# Patient Record
Sex: FEMALE | Race: WHITE
[De-identification: names, ages, dates, MRNs, and addresses within clinical notes are randomized per-mention and may not be internally consistent; named-entity substitution may affect disease eponyms.]

---

## 2017-04-27 ENCOUNTER — HOSPITAL ENCOUNTER (OUTPATIENT)
Dept: HOSPITAL 53 - M LAB REF | Age: 67
End: 2017-04-27
Attending: INTERNAL MEDICINE
Payer: MEDICARE

## 2017-04-27 DIAGNOSIS — R20.2: Primary | ICD-10-CM

## 2017-04-27 LAB
FERRITIN SERPL-MCNC: 63 NG/ML (ref 8–252)
FOLATE SERPL-MCNC: 23.1 NG/ML
VIT B12 SERPL-MCNC: 357 PG/ML

## 2017-09-26 ENCOUNTER — HOSPITAL ENCOUNTER (OUTPATIENT)
Dept: HOSPITAL 53 - M LABNEURO | Age: 67
End: 2017-09-26
Attending: PHYSICIAN ASSISTANT
Payer: MEDICARE

## 2017-09-26 DIAGNOSIS — R51: ICD-10-CM

## 2017-09-26 DIAGNOSIS — M25.50: Primary | ICD-10-CM

## 2017-09-29 LAB
B BURGDOR IGG+IGM SER-ACNC: <0.91 ISR (ref 0–0.9)
B BURGDOR IGM SER IA-ACNC: <0.8 INDEX (ref 0–0.79)

## 2019-04-11 ENCOUNTER — HOSPITAL ENCOUNTER (OUTPATIENT)
Dept: HOSPITAL 53 - M LAB REF | Age: 69
End: 2019-04-11
Attending: INTERNAL MEDICINE
Payer: MEDICARE

## 2019-04-11 DIAGNOSIS — R20.2: Primary | ICD-10-CM

## 2019-04-11 LAB
FOLATE SERPL-MCNC: 23.1 NG/ML
VIT B12 SERPL-MCNC: 613 PG/ML

## 2019-09-11 ENCOUNTER — HOSPITAL ENCOUNTER (OUTPATIENT)
Dept: HOSPITAL 53 - M LAB REF | Age: 69
End: 2019-09-11
Attending: INTERNAL MEDICINE
Payer: MEDICARE

## 2019-09-11 DIAGNOSIS — M79.10: Primary | ICD-10-CM

## 2020-01-09 ENCOUNTER — HOSPITAL ENCOUNTER (OUTPATIENT)
Dept: HOSPITAL 53 - M PLALAB | Age: 70
End: 2020-01-09
Attending: PHYSICIAN ASSISTANT
Payer: MEDICARE

## 2020-01-09 DIAGNOSIS — F33.1: Primary | ICD-10-CM

## 2020-01-09 DIAGNOSIS — Z79.899: ICD-10-CM

## 2020-01-09 LAB
ALBUMIN SERPL BCG-MCNC: 4.1 GM/DL (ref 3.2–5.2)
ALT SERPL W P-5'-P-CCNC: 28 U/L (ref 12–78)
BASOPHILS # BLD AUTO: 0 10^3/UL (ref 0–0.2)
BASOPHILS NFR BLD AUTO: 0.3 % (ref 0–1)
BILIRUB SERPL-MCNC: 0.5 MG/DL (ref 0.2–1)
BUN SERPL-MCNC: 15 MG/DL (ref 7–18)
CALCIUM SERPL-MCNC: 9.1 MG/DL (ref 8.8–10.2)
CHLORIDE SERPL-SCNC: 107 MEQ/L (ref 98–107)
CHOLEST SERPL-MCNC: 229 MG/DL (ref ?–200)
CHOLEST/HDLC SERPL: 1.99 {RATIO} (ref ?–5)
CO2 SERPL-SCNC: 28 MEQ/L (ref 21–32)
CREAT SERPL-MCNC: 0.79 MG/DL (ref 0.55–1.3)
EOSINOPHIL # BLD AUTO: 0.2 10^3/UL (ref 0–0.5)
EOSINOPHIL NFR BLD AUTO: 2.9 % (ref 0–3)
EST. AVERAGE GLUCOSE BLD GHB EST-MCNC: 131 MG/DL (ref 60–110)
GFR SERPL CREATININE-BSD FRML MDRD: > 60 ML/MIN/{1.73_M2} (ref 39–?)
GLUCOSE SERPL-MCNC: 108 MG/DL (ref 70–100)
HCT VFR BLD AUTO: 46.9 % (ref 36–47)
HDLC SERPL-MCNC: 115 MG/DL (ref 40–?)
HGB BLD-MCNC: 14.9 G/DL (ref 12–15.5)
LDLC SERPL CALC-MCNC: 83 MG/DL (ref ?–100)
LYMPHOCYTES # BLD AUTO: 2.7 10^3/UL (ref 1.5–5)
LYMPHOCYTES NFR BLD AUTO: 42 % (ref 24–44)
MCH RBC QN AUTO: 30.7 PG (ref 27–33)
MCHC RBC AUTO-ENTMCNC: 31.8 G/DL (ref 32–36.5)
MCV RBC AUTO: 96.7 FL (ref 80–96)
MONOCYTES # BLD AUTO: 0.5 10^3/UL (ref 0–0.8)
MONOCYTES NFR BLD AUTO: 7.8 % (ref 0–5)
NEUTROPHILS # BLD AUTO: 3 10^3/UL (ref 1.5–8.5)
NEUTROPHILS NFR BLD AUTO: 46.7 % (ref 36–66)
NONHDLC SERPL-MCNC: 114 MG/DL
PLATELET # BLD AUTO: 341 10^3/UL (ref 150–450)
POTASSIUM SERPL-SCNC: 4.6 MEQ/L (ref 3.5–5.1)
PROT SERPL-MCNC: 7.5 GM/DL (ref 6.4–8.2)
RBC # BLD AUTO: 4.85 10^6/UL (ref 4–5.4)
SODIUM SERPL-SCNC: 141 MEQ/L (ref 136–145)
TRIGL SERPL-MCNC: 153 MG/DL (ref ?–150)
WBC # BLD AUTO: 6.5 10^3/UL (ref 4–10)

## 2020-06-01 ENCOUNTER — HOSPITAL ENCOUNTER (OUTPATIENT)
Dept: HOSPITAL 53 - M PLALAB | Age: 70
End: 2020-06-01
Attending: INTERNAL MEDICINE
Payer: MEDICARE

## 2020-06-01 DIAGNOSIS — K59.00: Primary | ICD-10-CM

## 2020-06-01 DIAGNOSIS — R10.13: ICD-10-CM

## 2020-06-01 DIAGNOSIS — K44.9: ICD-10-CM

## 2020-06-01 DIAGNOSIS — R13.10: ICD-10-CM

## 2020-06-01 DIAGNOSIS — E55.9: ICD-10-CM

## 2020-06-01 DIAGNOSIS — R14.3: ICD-10-CM

## 2020-06-01 LAB
25(OH)D3 SERPL-MCNC: 37.4 NG/ML (ref 30–100)
ALBUMIN SERPL BCG-MCNC: 3.8 GM/DL (ref 3.2–5.2)
ALT SERPL W P-5'-P-CCNC: 30 U/L (ref 12–78)
BILIRUB SERPL-MCNC: 0.3 MG/DL (ref 0.2–1)
BUN SERPL-MCNC: 17 MG/DL (ref 7–18)
CALCIUM SERPL-MCNC: 8.9 MG/DL (ref 8.8–10.2)
CHLORIDE SERPL-SCNC: 108 MEQ/L (ref 98–107)
CO2 SERPL-SCNC: 26 MEQ/L (ref 21–32)
CREAT SERPL-MCNC: 0.87 MG/DL (ref 0.55–1.3)
GFR SERPL CREATININE-BSD FRML MDRD: > 60 ML/MIN/{1.73_M2} (ref 39–?)
GLUCOSE SERPL-MCNC: 141 MG/DL (ref 70–100)
MAGNESIUM SERPL-MCNC: 2.1 MG/DL (ref 1.8–2.4)
POTASSIUM SERPL-SCNC: 3.6 MEQ/L (ref 3.5–5.1)
PROT SERPL-MCNC: 7 GM/DL (ref 6.4–8.2)
SODIUM SERPL-SCNC: 141 MEQ/L (ref 136–145)
VIT B12 SERPL-MCNC: 691 PG/ML (ref 247–911)

## 2020-10-22 ENCOUNTER — HOSPITAL ENCOUNTER (OUTPATIENT)
Dept: HOSPITAL 53 - M PLALAB | Age: 70
End: 2020-10-22
Attending: PHYSICIAN ASSISTANT
Payer: MEDICARE

## 2020-10-22 DIAGNOSIS — G90.09: ICD-10-CM

## 2020-10-22 DIAGNOSIS — M54.5: ICD-10-CM

## 2020-10-22 DIAGNOSIS — R53.83: Primary | ICD-10-CM

## 2020-10-22 DIAGNOSIS — R53.1: ICD-10-CM

## 2020-10-22 LAB
FERRITIN SERPL-MCNC: 50 NG/ML (ref 8–252)
FOLATE SERPL-MCNC: > 24 NG/ML (ref 5.4–?)
IRON SERPL-MCNC: 115 UG/DL (ref 50–170)
RHEUMATOID FACT SERPL-ACNC: < 10 IU/ML (ref ?–15)
VIT B12 SERPL-MCNC: 932 PG/ML (ref 247–911)

## 2020-12-03 ENCOUNTER — HOSPITAL ENCOUNTER (OUTPATIENT)
Dept: HOSPITAL 53 - M LABSMTC | Age: 70
End: 2020-12-03
Attending: PEDIATRICS
Payer: SELF-PAY

## 2020-12-03 DIAGNOSIS — Z20.828: Primary | ICD-10-CM

## 2021-03-01 ENCOUNTER — HOSPITAL ENCOUNTER (EMERGENCY)
Dept: HOSPITAL 53 - M ED | Age: 71
Discharge: HOME | End: 2021-03-01
Payer: MEDICARE

## 2021-03-01 VITALS — DIASTOLIC BLOOD PRESSURE: 77 MMHG | SYSTOLIC BLOOD PRESSURE: 141 MMHG
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
## 2021-03-01 DIAGNOSIS — E78.5: ICD-10-CM

## 2021-03-01 DIAGNOSIS — S82.832A: Primary | ICD-10-CM

## 2021-03-01 DIAGNOSIS — Z88.2: ICD-10-CM

## 2021-03-01 DIAGNOSIS — I10: ICD-10-CM
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
## 2021-03-01 DIAGNOSIS — G43.909: ICD-10-CM
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
## 2021-03-01 DIAGNOSIS — Z79.899: ICD-10-CM

## 2021-03-01 DIAGNOSIS — S82.55XA: ICD-10-CM

## 2021-03-01 PROCEDURE — 73590 X-RAY EXAM OF LOWER LEG: CPT

## 2021-03-01 PROCEDURE — 96374 THER/PROPH/DIAG INJ IV PUSH: CPT

## 2021-03-01 PROCEDURE — 99284 EMERGENCY DEPT VISIT MOD MDM: CPT

## 2021-03-01 PROCEDURE — 73610 X-RAY EXAM OF ANKLE: CPT

## 2021-03-01 NOTE — REP
INDICATION:

fall on ice



COMPARISON:

None.



TECHNIQUE:

AP, lateral, bilateral oblique views.



FINDINGS:

There is an oblique nondisplaced fracture of the distal fibular diaphysis and fracture

of the posterior malleolus along with diffuse ankle swelling.



IMPRESSION:

Fractures of the distal fibula and posterior malleolus with swelling.





<Electronically signed by Maycol Lenz > 03/01/21 [id number]

## 2021-03-02 NOTE — REP
INDICATION:

post splinting films



COMPARISON:

03/01/2021



TECHNIQUE:

AP, lateral, bilateral oblique views.



FINDINGS:

Patient is status post splinting for distal fibular and posterior malleolar fractures.



IMPRESSION:

Status post splinting for fractures.





<Electronically signed by Maycol Lenz > 03/01/21 [id number]

## 2021-03-04 ENCOUNTER — HOSPITAL ENCOUNTER (OUTPATIENT)
Dept: HOSPITAL 53 - M LABSMTC | Age: 71
End: 2021-03-04
Attending: ANESTHESIOLOGY
Payer: MEDICARE

## 2021-03-04 DIAGNOSIS — Z01.812: Primary | ICD-10-CM

## 2021-03-04 DIAGNOSIS — Z20.822: ICD-10-CM

## 2021-03-08 ENCOUNTER — HOSPITAL ENCOUNTER (OUTPATIENT)
Dept: HOSPITAL 53 - M SDC | Age: 71
Setting detail: OBSERVATION
LOS: 1 days | Discharge: HOME | End: 2021-03-09
Attending: GENERAL PRACTICE | Admitting: GENERAL PRACTICE
Payer: MEDICARE

## 2021-03-08 VITALS — DIASTOLIC BLOOD PRESSURE: 60 MMHG | SYSTOLIC BLOOD PRESSURE: 116 MMHG

## 2021-03-08 VITALS — BODY MASS INDEX: 24.87 KG/M2 | WEIGHT: 149.25 LBS | HEIGHT: 65 IN

## 2021-03-08 VITALS — SYSTOLIC BLOOD PRESSURE: 142 MMHG | DIASTOLIC BLOOD PRESSURE: 76 MMHG

## 2021-03-08 VITALS — DIASTOLIC BLOOD PRESSURE: 77 MMHG | SYSTOLIC BLOOD PRESSURE: 144 MMHG

## 2021-03-08 VITALS — DIASTOLIC BLOOD PRESSURE: 81 MMHG | SYSTOLIC BLOOD PRESSURE: 151 MMHG

## 2021-03-08 VITALS — DIASTOLIC BLOOD PRESSURE: 69 MMHG | SYSTOLIC BLOOD PRESSURE: 123 MMHG

## 2021-03-08 DIAGNOSIS — J95.89: ICD-10-CM

## 2021-03-08 DIAGNOSIS — Z87.891: ICD-10-CM

## 2021-03-08 DIAGNOSIS — F32.9: ICD-10-CM

## 2021-03-08 DIAGNOSIS — R09.02: ICD-10-CM

## 2021-03-08 DIAGNOSIS — Z79.899: ICD-10-CM

## 2021-03-08 DIAGNOSIS — F41.9: ICD-10-CM

## 2021-03-08 DIAGNOSIS — Z88.2: ICD-10-CM

## 2021-03-08 DIAGNOSIS — Z91.81: ICD-10-CM

## 2021-03-08 DIAGNOSIS — G47.33: ICD-10-CM

## 2021-03-08 DIAGNOSIS — S82.842A: Primary | ICD-10-CM

## 2021-03-08 DIAGNOSIS — E78.00: ICD-10-CM

## 2021-03-08 DIAGNOSIS — K21.9: ICD-10-CM

## 2021-03-08 DIAGNOSIS — J44.9: ICD-10-CM

## 2021-03-08 DIAGNOSIS — I10: ICD-10-CM
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
## 2021-03-08 DIAGNOSIS — G43.909: ICD-10-CM
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
## 2021-03-08 PROCEDURE — 97165 OT EVAL LOW COMPLEX 30 MIN: CPT

## 2021-03-08 PROCEDURE — 96372 THER/PROPH/DIAG INJ SC/IM: CPT

## 2021-03-08 PROCEDURE — 85027 COMPLETE CBC AUTOMATED: CPT

## 2021-03-08 PROCEDURE — 80061 LIPID PANEL: CPT

## 2021-03-08 PROCEDURE — 27814 TREATMENT OF ANKLE FRACTURE: CPT

## 2021-03-08 PROCEDURE — 84443 ASSAY THYROID STIM HORMONE: CPT

## 2021-03-08 PROCEDURE — 36415 COLL VENOUS BLD VENIPUNCTURE: CPT

## 2021-03-08 PROCEDURE — 97530 THERAPEUTIC ACTIVITIES: CPT

## 2021-03-08 PROCEDURE — 80048 BASIC METABOLIC PNL TOTAL CA: CPT

## 2021-03-08 PROCEDURE — 83735 ASSAY OF MAGNESIUM: CPT

## 2021-03-08 PROCEDURE — 93005 ELECTROCARDIOGRAM TRACING: CPT

## 2021-03-08 PROCEDURE — 97161 PT EVAL LOW COMPLEX 20 MIN: CPT

## 2021-03-08 PROCEDURE — 76000 FLUOROSCOPY <1 HR PHYS/QHP: CPT

## 2021-03-08 RX ADMIN — FENTANYL CITRATE PRN MCG: 50 INJECTION, SOLUTION INTRAMUSCULAR; INTRAVENOUS at 17:31

## 2021-03-08 RX ADMIN — FENTANYL CITRATE PRN MCG: 50 INJECTION, SOLUTION INTRAMUSCULAR; INTRAVENOUS at 17:18

## 2021-03-08 RX ADMIN — SODIUM CHLORIDE, POTASSIUM CHLORIDE, SODIUM LACTATE AND CALCIUM CHLORIDE SCH MLS/HR: 600; 310; 30; 20 INJECTION, SOLUTION INTRAVENOUS at 23:47

## 2021-03-08 RX ADMIN — HYDROMORPHONE HYDROCHLORIDE PRN MG: 1 INJECTION, SOLUTION INTRAMUSCULAR; INTRAVENOUS; SUBCUTANEOUS at 18:03

## 2021-03-08 RX ADMIN — HYDROMORPHONE HYDROCHLORIDE PRN MG: 1 INJECTION, SOLUTION INTRAMUSCULAR; INTRAVENOUS; SUBCUTANEOUS at 17:47

## 2021-03-08 NOTE — REP
INDICATION:

ORIF LEFT ANKLE.



COMPARISON:

None.



TECHNIQUE:

Intraoperative fluoroscopic imaging using portable C-arm technique.



FINDINGS:

Images demonstrate fixation for distal fibular and medial malleolar fractures.

Posterior malleolar fracture identified.  Total fluoroscopic time 63.3 seconds.



IMPRESSION:

Open reduction and fixation.





<Electronically signed by Maycol Lenz > 03/08/21 [id number]

## 2021-03-08 NOTE — HPEPDOC
[facility] Medical History & Physical


Date of Admission


Mar 8, 2021


Date of Service:  Mar 8, 2021





History and Physical


CHIEF COMPLAINT: 





Hypoxia Oxygen saturation mid 80s on room air in the recovery room postop left 

ankle ORIF








HISTORY OF PRESENT ILLNESS: 





70-year-old female, focal with history of obstructive sleep apnea not on CPAP, 

COPD not on home oxygen without hypoxic respiratory failure, status post left 

ankle ORIF secondary to left ankle fracture, status post rheumatic injury 

admitted for observation due to hypoxia postoperatively after being given fent

anyl for pain control. Patient has underlying obstructive sleep apnea but has 

not seen a pulmonologist in several years and managed by her primary care 

physician, Dr. Afsaneh White patient does not have CPAP at home. Patient's 

oxygen level was in the mid 80s on finger pulse oximeter which improved once she

awakened. Hospitalist was asked to admit her for observation overnight due to 

hypoxia was likely secondary to pain medications in the setting of chronic 

obstructive sleep apnea. Patient otherwise denies fever, chills, cough, 

shortness of breath, chest pain, pressure, tightness, lightheadedness, 

dizziness, nausea, vomiting, diarrhea, abdominal pain, constipation, weight 

changes, changes in sleep habits, sore throat, changes in vision, tinnitus, ear 

discharge, polyuria, polydipsia, polyphagia, skin rashes,  depression bilateral 

upper and lower extremity weakness or paresthesias. She complains of 5 out of 10

pain in the left foot, status post ORIF.








PAST MEDICAL HISTORY:


Obstructive sleep apnea not on CPAP, COPD, not oxygen or steroid dependent, 

hypertension, depression, migraines, anxiety, hypercholesterolemia, gastritis, 








PAST SURGICAL HISTORY:


Nissen fundoplication laparoscopic cholecystectomy tracheotomy, bunion removal, 

back surgery, total hysterectomy, EGD








SOCIAL HISTORY:


10-pack-year history of smoking previously smoked 3 packs per day from age 17 to

age 31. Quit many years ago. Quit alcohol use. She is a caregiver for her 

. 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

years but retired since her healthcare proxy is her , Zackery [phone].

She is a full code








FAMILY HISTORY:


Both parents are . Father  age of 48 with CAD, MI. Mother  at 

the age of 92 with heart disease








ALLERGIES: Please see below.








REVIEW OF SYSTEMS:


10 point review of system is negative aside from positive findings in HPI








HOME MEDICATIONS: Please see below. 








PHYSICAL EXAMINATION:


VITAL SIGNS: See below


GENERAL APPEARANCE: Awake, alert, oriented 3. No conversational dyspnea. No 

pallor, icterus or jaundice. Speaks in full sentences without use of respiratory

accessory muscles


HEENT: Face is symmetric. Tongue is midline. Pupils equally round, reactive to 

light accommodation. Extra muscles are intact. Trachea and uvula are midline. No

JVD, thyromegaly, stridor or cervical lymphadenopathy. Dry mucous membranes. No 

carotid bruits


CARDIOVASCULAR: S1, S2 regular rate rhythm, no murmurs, rubs or gallops


LUNGS: Air entry is equal bilaterally. No scoliosis. No use of respiratory a

ccessory muscles. Clear to auscultation with bronchial breath sounds. No 

adventitious breath sounds without wheezing, rales or rhonchi


ABDOMEN: Positive bowel sounds, soft, nontender, nondistended, no rebound, 

guarding, hepatosplenomegaly, or fluid wave. No abdominal bruits noted


EXTREMITIES: Postop left ankle . No pitting edema bilateral lower extremities


SKIN: Warm, dry, pink in color








LABORATORY DATA: See below.








IMAGING: 








Intraoperative fluoroscopic imaging using portable C-arm technique.


FINDINGS:


Images demonstrate fixation for distal fibular and medial malleolar fractures.


Posterior malleolar fracture identified.  Total fluoroscopic time 63.3 seconds.


IMPRESSION:


Open reduction and fixation.


<Electronically signed by Maycol Lenz > [id number]








MICROBIOLOGY: Please see below. 








ASSESSMENT/PLAN:





70-year-old female, focal with history of obstructive sleep apnea not on CPAP, 

COPD not on home oxygen without hypoxic respiratory failure, status post left 

ankle ORIF secondary to left ankle fracture, status post rheumatic injury 

admitted for observation due to hypoxia postoperatively after being given fe

ntanyl for pain control. Patient has underlying obstructive sleep apnea but has 

not seen a pulmonologist in several years and managed by her primary care 

physician, Dr. Afsaneh White patient does not have CPAP at home. Patient's 

oxygen level was in the mid 80s on finger pulse oximeter which improved once she

awakened. Hospitalist was asked to admit her for observation overnight due to 

hypoxia was likely secondary to pain medications in the setting of chronic 

obstructive sleep apnea. 








Postoperative hypoxemia in the setting of chronic obstructive sleep apnea and 

pain medications status post anesthesia


-Patient is otherwise hemodynamically stable without tachypnea or tachycardia.  

She appears stable now awake on room air satting %.  She admits to nonco

mpliance with CPAP due to chronic migraines and intolerance of CPAP with 

worsening headaches during the night.


-MAXINE protocol, 2 L oxygen daily at bedtime.  Minimize sedatives and pain 

medications. No signs of COPD exacerbation without wheezing on examination to 

warrant steroid use. As needed nebulizers will be provided.








 Left ankle fracture status post ORIF


-Postop management per orthopedic surgery, Dr. Stnoe , including DVT 

prophylaxis, pain medications, bowel regimen and activity recommendations. PTOT 

consult in the morning








Hypertension, uncontrolled


-Secondary to pain. Resume home medications. Titrate as needed for better blood 

pressure control








 Hypercholesterolemia


-Chronic








 GERD/history of Nissen fundoplication/gastritis


-Chronic. Resume home meds








Obstructive sleep apnea


-Does not use CPAP at home due to intolerance with worsening migraines at night.








History of migraines


-Currently asymptomatic.  Patient follows at [facility] with Emelia Fernandez and Dr. OBANDO , status post Botox injections and on chronic migraine 

prophylaxis therapy.








COPD, compensated


-Avoid oversedation with pain medications due to risk of respiratory acidosis 

with underlying obstructive sleep apnea and COPD. As needed nebulizers








Diet: 2 g sodium








DVT prophylaxis: Per orthopedic surgery








CODE STATUS: Full code





Vital Signs





Vital Signs








  Date Time  Temp Pulse Resp B/P (MAP) Pulse Ox O2 Delivery O2 Flow Rate FiO2


 


3/8/21 20:00  82 18  94 Room Air  


 


3/8/21 19:45    156/83 (107)    


 


3/8/21 18:15 96.9       


 


3/8/21 17:25       3 











Home Medications


Scheduled


Amlodipine Besylate (Amlodipine Besylate) 5 Mg Tablet, 5 MG PO QHS


Buspirone HCl (Buspirone HCl) 5 Mg Tablet, 5 MG PO DAILY


Erenumab-Aooe (Aimovig Autoinjector) 140 Mg/1 Ml Auto.injct, 140 MG IM QMONTH


   1ST DAY OF THE MONTH 


Escitalopram Oxalate (Lexapro) 20 Mg Tablet, 20 MG PO DAILY


Multivitamin (Multivitamins) 1 Each Tablet, 1 TAB PO DAILY


Nortriptyline HCl (Nortriptyline HCl) 10 Mg Capsule, 10 MG PO QHS


Omega-3 Fatty Acids/Fish Oil (Fish Oil 1,000 mg Capsule) 1 Each Capsule, 1,000 

MG PO DAILY


Pravastatin Sodium (Pravastatin Sodium) 20 Mg Tablet, 20 MG PO QHS


Zonisamide (Zonisamide) 100 Mg Capsule, 100 MG PO QHS





Scheduled PRN


Naratriptan HCl (Naratriptan HCl) 2.5 Mg Tablet, 2.5 MG PO DAILY PRN for 

MIGRAINE


Ondansetron HCl (Ondansetron HCl) 4 Mg Tablet, 4 MG PO Q6H PRN for NAUSEA OR 

VOMITING





Allergies


Coded Allergies:  


     Sulfa (Sulfonamide Antibiotics) (Verified  Allergy, Intermediate, HIVES, 

3/1/21)





A-FIB/CHADSVASC


A-FIB History


Current/History of A-Fib/PAF?:  No


Current PO Anticoag Therapy:  No





Age/Risk Factor Scoring


CHADSVASC:  








CHADSVASC Response (Comments) Value


 


Age Risk Factor Age 65-74 years old 1


 


Gender Risk Factor Female 1


 


Hx of CHF No 0


 


Hx of HTN Yes 1


 


Hx of Stroke/TIA/or VTE No 0


 


Hx of Diabetes No 0


 


Hx of Vascular Disease No 0


 


Total  3











Treatment


Treatment ordered:  NONE











ALIS COLEMAN MD             Mar 8, 2021 20:20

## 2021-03-08 NOTE — ROOPDOC
[facility] Report Of Operation


Report of Operation


DATE OF PROCEDURE: 3/8/21





PREPROCEDURE DIAGNOSES: Left ankle fracture.





POSTPROCEDURE DIAGNOSES: Left ankle fracture.





PROCEDURE: Left ankle open reduction internal fixation. 





SURGEON: Dr. Alexandrea Cannon MD





ASSISTANT: Not applicable





ANESTHESIA: Gen. anesthesia Dr. Alan.





ESTIMATED BLOOD LOSS: Approximately 50 mL. 





COMPLICATIONS: None. 





REMARKS: Follow-up in 2 weeks' time. Nonweightbearing. Prescription sent into 

pharmacy [facility]. Appropriate narcotic counseling 

given. 





Risk factors for harms from taking opioid medications discussed and assessed 

including but not limited to personal or family history of substance use 

disorder, anxiety or depression, pregnancy, age 65 or older, COPD or other 

underlying respiratory conditions, and renal or hepatic insufficiency. 





Discussed with patient concerns and determined any harms they may experience or 

be currently experiencing such as nausea or constipation, feeling sedated or 

confused, breathing interruptions during sleep, or taking or craving more 

opioids than prescribed or difficulty controlling use (addiction). Discussed 

early warning signs of overdose including confusion, sedation, slurred speech, 

abnormal gait. 








PROCEDURE NOTE: The 70-year-old female sustained an unstable left ankle 

fracture. We discussed the pros and cons  to risks and benefits go ahead with 

surgery. She wished to proceed. Marked left lower extremity she had no further 

questions. 





The risks, including, but not limited to bleeding, infection, damage to nerves, 

vessels, and other structures, continued pain, delayed, mal, or nonunion if 

fracture, stiffness, recurrence or re-tear, need for further surgery, blood 

clots, medical problems and death, were discussed. The patient understands and 

provided informed consent. 





DESCRIPTION OF PROCEDURE: Patient is brought to the operating theater. They were

placed supine on the operating room table. General anesthesia was induced. 2 g 

of IV Ancef was given prior to the start of the case. Bump was placed under the 

left hip. Tourniquet was placed to the left thigh 34 inches appropriately 

padded. All bony prominences appropriately padded. SCD used on the down leg. 

Bone foam leg positioner was used to elevate the leg. Leg was prepped and draped

in the usual sterile fashion with chlorhexidine-based prep solution allowing the

prep solution to thoroughly dry for over 3 minutes time prior to draping. 

Preoperative timeout was performed, to confirm the site the patient and the 

surgery.





I began by elevating the limb and inflating the tourniquet to 250 mmHg. I made a

standard lateral incision approach to the distal fibula. I carried this 

dissection down through skin and subcutaneous tissue achieved meticulous he

mostasis. I identified the fracture site. I cleaned away any interposed 

periosteum and hematoma. I achieved a preliminary reduction with pointed 

fracture reduction forceps. I used lag by technique to insert a 2.7 mm fully 

threaded cortical screw from proximal anterior to distal posterior. This 

achieved good purchase and compression at the fracture site. I precontoured a 8 

hole one third tubular plate to the lateral side of the fibula with 3 holes 

proximal to the fracture site and 3 holes distal. I then inserted 3 fully 

threaded cortical screws proximal to the fracture site to secure the plate down 

to bone. I inserted 2 fully threaded cortical screws distal to the fracture as 

well as one fully threaded cancellus screw at the distal most hole of the plate.





I then made a standard slightly curved incision longitudinally over the medial 

malleolus. I carried this dissection down through skin and subcutaneous tissue 

achieved meticulous hemostasis. I protected the saphenous vein and retracted 

this anteriorly. I identified the fracture site. There is a mild amount of 

comminution posteromedially. I removed this from around the posterior tibialis 

tendon. I then placed 2 partially-threaded cancellus 4.0 mm cannulated screws 

across the medial malleolus.





The fractures appeared out to length well aligned with no talar shift and normal

tibiofibular clear space and overlap. I then performed the cotton workup / hook 

test with no increase at the syndesmosis no widening as well as an external 

rotation stress test that showed the syndesmosis to be stable. 





Tourniquet was let down at the end of the case. Wounds were thoroughly irrigated

with normal saline. Subcutaneous tissue is closed with interrupted 2-0 Vicryl 

sutures and skin staples. Skin is clean with wet-to-dry dressing. 12 mL of 1/4% 

Marcaine with 1 in 100,000 epinephrine was instilled in around the incision 

sites. Adaptic, 4 x 8 gauze, ABDs dressings were then placed and over wrapped 

with sterile cast padding. Below-knee 3 sided plaster of Berenice splint was then 

placed and allowed to fully harden with the foot in neutral and overwrapped with

6 inch Ace bandages.





Patient was woken up from a general anesthetic transferred off the operating 

table and taken to postanesthetic care unit in stable condition. All sponge, 

needle and instrument counts are correct. No complications. Estimated blood loss

50 mL. Plan for the patient is to be nonweightbearing follow-up in the office in

2 weeks' time. Prescription has been sent into pharmacy of choice 

electronically. They will be discharged home according to day surgery criteria 

when they are comfortable. I communicated this to her son over the phone prior 

to the case. I saw the patient afterwards in holding and there were 

neurovascularly intact and comfortable. I have chosen not to use VTE prophylaxis

due to this being a below the knee surgery and not routinely indicated.











ALEXANDREA CANNON MD               Mar 8, 2021 19:06

## 2021-03-08 NOTE — ECGEPIP
[facility]

                                       

                                       Test Date:    2021

Pat Name:     CUONG ARORA          Department:   

Patient ID:   [id number]                 Room:         -

Gender:       Female                   Technician:   

:          [id number]               Requested By: MAHSA MCNEAL 

Order Number: [id number]     Reading MD:   Geovanna Maldonado

                                 Measurements

Intervals                              Axis          

Rate:         74                       P:            83

TX:           150                      QRS:          5

QRSD:         78                       T:            53

QT:           404                                    

QTc:          448                                    

                           Interpretive Statements

Normal sinus rhythm

SIMILAR TO 1/19/15

Electronically Signed on 3-8-2021 14:52:09 EST by Geovanna Maldonado

## 2021-03-09 VITALS — SYSTOLIC BLOOD PRESSURE: 122 MMHG | DIASTOLIC BLOOD PRESSURE: 67 MMHG

## 2021-03-09 VITALS — DIASTOLIC BLOOD PRESSURE: 68 MMHG | SYSTOLIC BLOOD PRESSURE: 124 MMHG

## 2021-03-09 VITALS — DIASTOLIC BLOOD PRESSURE: 67 MMHG | SYSTOLIC BLOOD PRESSURE: 126 MMHG

## 2021-03-09 LAB
BUN SERPL-MCNC: 12 MG/DL (ref 7–18)
CALCIUM SERPL-MCNC: 8.4 MG/DL (ref 8.8–10.2)
CHLORIDE SERPL-SCNC: 107 MEQ/L (ref 98–107)
CHOLEST SERPL-MCNC: 173 MG/DL (ref ?–200)
CHOLEST/HDLC SERPL: 1.76 {RATIO} (ref ?–5)
CO2 SERPL-SCNC: 28 MEQ/L (ref 21–32)
CREAT SERPL-MCNC: 0.66 MG/DL (ref 0.55–1.3)
GFR SERPL CREATININE-BSD FRML MDRD: > 60 ML/MIN/{1.73_M2} (ref 39–?)
GLUCOSE SERPL-MCNC: 116 MG/DL (ref 70–100)
HCT VFR BLD AUTO: 34.1 % (ref 36–47)
HDLC SERPL-MCNC: 98 MG/DL (ref 40–?)
HGB BLD-MCNC: 10.8 G/DL (ref 12–15.5)
LDLC SERPL CALC-MCNC: 63 MG/DL (ref ?–100)
MAGNESIUM SERPL-MCNC: 2 MG/DL (ref 1.8–2.4)
MCH RBC QN AUTO: 30.4 PG (ref 27–33)
MCHC RBC AUTO-ENTMCNC: 31.7 G/DL (ref 32–36.5)
MCV RBC AUTO: 96.1 FL (ref 80–96)
NONHDLC SERPL-MCNC: 75 MG/DL
PLATELET # BLD AUTO: 360 10^3/UL (ref 150–450)
POTASSIUM SERPL-SCNC: 4.3 MEQ/L (ref 3.5–5.1)
RBC # BLD AUTO: 3.55 10^6/UL (ref 4–5.4)
SODIUM SERPL-SCNC: 140 MEQ/L (ref 136–145)
TRIGL SERPL-MCNC: 61 MG/DL (ref ?–150)
TSH SERPL DL<=0.005 MIU/L-ACNC: 0.47 UIU/ML (ref 0.36–3.74)
WBC # BLD AUTO: 6.8 10^3/UL (ref 4–10)

## 2021-03-09 RX ADMIN — SODIUM CHLORIDE, POTASSIUM CHLORIDE, SODIUM LACTATE AND CALCIUM CHLORIDE SCH MLS/HR: 600; 310; 30; 20 INJECTION, SOLUTION INTRAVENOUS at 01:15

## 2021-03-09 NOTE — DS.PDOC
Discharge Summary


General


Date of Admission


3/9/21


Date of Discharge


3/9/21





Discharge Summary


PROCEDURES PERFORMED DURING STAY: [None].





ADMITTING DIAGNOSES: 


Postoperative hypoxemia 


Left ankle fracture status post ORIF


Hypertension, uncontrolled


Hypercholesterolemia


Obstructive sleep apnea


GERD/history of Nissen fundoplication/gastritis


History of migraines


COPD, compensated





DISCHARGE DIAGNOSES:





 Left ankle fracture status post ORIF


Hypertension, uncontrolled


Hypercholesterolemia


Obstructive sleep apnea


GERD/history of Nissen fundoplication/gastritis


History of migraines


COPD, compensated





COMPLICATIONS/CHIEF COMPLAINT: Left Ankle Fracture.





HISTORY OF PRESENT ILLNESS: 70-year-old female, focal with history of 

obstructive sleep apnea not on CPAP, COPD not on home oxygen without hypoxic 

respiratory failure, status post left ankle ORIF secondary to left ankle 

fracture, status post rheumatic injury admitted for observation due to hypoxia 

postoperatively after being given fentanyl for pain control. Patient has 

underlying obstructive sleep apnea but has not seen a pulmonologist in several 

years and managed by her primary care physician, Dr. Afsaneh White patient does

 not have CPAP at home. Patient's oxygen level was in the mid 80s on finger 

pulse oximeter which improved once she awakened. Hospitalist was asked to admit 

her for observation overnight due to hypoxia was likely secondary to pain 

medications in the setting of chronic obstructive sleep apnea. Patient otherwise

 denies fever, chills, cough, shortness of breath, chest pain, pressure, 

tightness, lightheadedness, dizziness, nausea, vomiting, diarrhea, abdominal 

pain, constipation, weight changes, changes in sleep habits, sore throat, 

changes in vision, tinnitus, ear discharge, polyuria, polydipsia, polyphagia, 

skin rashes,  depression bilateral upper and lower extremity weakness or 

paresthesias. She complains of 5 out of 10 pain in the left foot, status post 

ORIF.











HOSPITAL COURSE: During the hospital stay following issue addressed





Postoperative hypoxemia in the setting of chronic obstructive sleep apnea and 

pain medications status post anesthesia


Patient was treated with inhalers, oxygen supplementation and CPAP with positive

 dynamics








 Left ankle fracture status post ORIF


-Postop management per orthopedic surgery, Dr. Stone , including DVT 

prophylaxis, pain medications, bowel regimen and activity recommendations








Hypertension, uncontrolled


-Secondary to pain. Resume home medications. Titrate as needed for better blood 

pressure control








 Hypercholesterolemia


-Chronic








 GERD/history of Nissen fundoplication/gastritis


-Chronic. Resume home meds








Obstructive sleep apnea


-Does not use CPAP at home due to intolerance with worsening migraines at night.








History of migraines


-Currently asymptomatic.  Patient follows at [facility] with Emelia Fernandez and Dr. OBANDO , status post Botox injections and on chronic migraine 

prophylaxis therapy.








COPD, compensated


-Avoid oversedation with pain medications due to risk of respiratory acidosis 

with underlying obstructive sleep apnea and COPD. As needed nebulizers





DISCHARGE MEDICATIONS: Please see below.


 


ALLERGIES: Please see below.





PHYSICAL EXAMINATION ON DISCHARGE:


GENERAL APPEARANCE: Awake, alert, oriented 3. No conversational dyspnea. No 

pallor, icterus or jaundice. Speaks in full sentences without use of respiratory

 accessory muscles


HEENT: Face is symmetric. Tongue is midline. Pupils equally round, reactive to 

light accommodation. Extra muscles are intact. Trachea and uvula are midline. No

 JVD, thyromegaly, stridor or cervical lymphadenopathy. Dry mucous membranes. No

 carotid bruits


CARDIOVASCULAR: S1, S2 regular rate rhythm, no murmurs, rubs or gallops


LUNGS: Air entry is equal bilaterally. No scoliosis. No use of respiratory 

accessory muscles. Clear to auscultation with bronchial breath sounds. No 

adventitious breath sounds without wheezing, rales or rhonchi


ABDOMEN: Positive bowel sounds, soft, nontender, nondistended, no rebound, 

guarding, hepatosplenomegaly, or fluid wave. No abdominal bruits noted


EXTREMITIES: Postop left ankle . No pitting edema bilateral lower extremities


SKIN: Warm, dry, pink in color





LABORATORY DATA: Please see below.





IMAGING:INDICATION:


ORIF LEFT ANKLE.





COMPARISON:


None.





TECHNIQUE:


Intraoperative fluoroscopic imaging using portable C-arm technique.





FINDINGS:


Images demonstrate fixation for distal fibular and medial malleolar fractures.


Posterior malleolar fracture identified.  Total fluoroscopic time 63.3 seconds.





IMPRESSION:


Open reduction and fixation.





PROGNOSIS: Fair





ACTIVITY: [As tolerated].





DIET: Regular











DISPOSITION: Home








ITEMS TO FOLLOWUP ON ON OUTPATIENT:


Follow-up with orthopedic surgeon and PCP





DISCHARGE CONDITION: [Stable].





TIME SPENT ON DISCHARGE: 20 minutes.





Vital Signs/I&Os





Vital Signs








  Date Time  Temp Pulse Resp B/P (MAP) Pulse Ox O2 Delivery O2 Flow Rate FiO2


 


3/9/21 11:13   18   Room Air  


 


3/9/21 10:00 98.5 73  124/68 (86) 96   


 


3/9/21 06:00       2.0 














I&O- Last 24 Hours up to 6 AM 


 


 3/9/21





 06:00


 


Intake Total 1585 ml


 


Output Total 850 ml


 


Balance 735 ml











Laboratory Data


Labs 24H


Laboratory Tests 2


3/9/21 05:23: 


Nucleated Red Blood Cells % (auto) 0.0, Anion Gap 5L, Glomerular Filtration Rate

 > 60.0, Calcium Level 8.4L, Magnesium Level 2.0, Triglycerides Level 61, Total 

Cholesterol 173, LDL Cholesterol 63, Non-HDL Cholesterol (LDL + VLDL) 75, Total 

HDL Cholesterol 98, Cholesterol/HDL Ratio 1.765, Thyroid Stimulating Hormone 

(TSH) 0.471


CBC/BMP


Laboratory Tests


3/9/21 05:23











Discharge Medications


Scheduled


Amlodipine Besylate (Amlodipine Besylate) 5 Mg Tablet, 5 MG PO QHS, (Reported)


Buspirone HCl (Buspirone HCl) 5 Mg Tablet, 5 MG PO DAILY, (Reported)


Erenumab-Aooe (Aimovig Autoinjector) 140 Mg/1 Ml Auto.injct, 140 MG IM QMONTH, 

(Reported)


   1ST DAY OF THE MONTH 


Escitalopram Oxalate (Lexapro) 20 Mg Tablet, 20 MG PO DAILY, (Reported)


Multivitamin (Multivitamins) 1 Each Tablet, 1 TAB PO DAILY, (Reported)


Nortriptyline HCl (Nortriptyline HCl) 10 Mg Capsule, 10 MG PO QHS, (Reported)


Omega-3 Fatty Acids/Fish Oil (Fish Oil 1,000 mg Capsule) 1 Each Capsule, 1,000 

MG PO DAILY, (Reported)


Pravastatin Sodium (Pravastatin Sodium) 20 Mg Tablet, 20 MG PO QHS, (Reported)


Zonisamide (Zonisamide) 100 Mg Capsule, 100 MG PO QHS, (Reported)





Scheduled PRN


Naratriptan HCl (Naratriptan HCl) 2.5 Mg Tablet, 2.5 MG PO DAILY PRN for 

MIGRAINE, (Reported)


Ondansetron HCl (Ondansetron HCl) 4 Mg Tablet, 4 MG PO Q6H PRN for NAUSEA OR 

VOMITING, (Reported)





Allergies


Coded Allergies:  


     Sulfa (Sulfonamide Antibiotics) (Verified  Allergy, Intermediate, HIVES, 

3/1/21)











MARIA A CASTANON DO             Mar 9, 2021 17:24

## 2021-03-23 ENCOUNTER — HOSPITAL ENCOUNTER (OUTPATIENT)
Dept: HOSPITAL 53 - M SOG | Age: 71
End: 2021-03-23
Attending: ORTHOPAEDIC SURGERY
Payer: MEDICARE

## 2021-03-23 DIAGNOSIS — Z47.89: Primary | ICD-10-CM

## 2021-03-23 NOTE — REP
INDICATION:

F/U FX.



COMPARISON:

Comparison radiographs March 1, 2021..



TECHNIQUE:

Three views.



FINDINGS:

Patient is status post distal fibular screw plate fixation and medial malleolar

pinning for trimalleolar distal tibial and fibular fractures.  Ankle mortise is

intact.  There is some residual medial and lateral soft tissue swelling.  No

malalignment or displacement seen.  Plantar heel spurring is noted.



IMPRESSION:

Healing distal fibular and medial malleolar fractures.





<Electronically signed by Terrence Do > 03/23/21 [id number]

## 2021-04-15 ENCOUNTER — HOSPITAL ENCOUNTER (OUTPATIENT)
Dept: HOSPITAL 53 - M SOG | Age: 71
End: 2021-04-15
Attending: ORTHOPAEDIC SURGERY
Payer: MEDICARE

## 2021-04-15 DIAGNOSIS — Z47.89: Primary | ICD-10-CM

## 2021-04-15 NOTE — REP
INDICATION:

A. Fracture follow-up.



COMPARISON:

Comparison study March 23, 2021 and March 1, 2021..



TECHNIQUE:

Four views of the left ankle are obtained.



FINDINGS:

There is some periosteal reaction consistent with healing at the posterior malleolar

and medial malleolar fracture sites.  Screw plate fixation device remains in place in

the distal fibula and 2 metallic screws are seen in the medial malleolus.  Ankle

mortise is intact.  Diffuse soft tissue swelling persists.  There are metallic pins in

the 1st metatarsal noted incidentally.



IMPRESSION:

Healing trimalleolar fracture post orthopedic fixation.





<Electronically signed by Terrence Do > 04/15/21 [id number]

## 2021-04-30 ENCOUNTER — HOSPITAL ENCOUNTER (OUTPATIENT)
Dept: HOSPITAL 53 - M PT | Age: 71
End: 2021-04-30
Attending: ORTHOPAEDIC SURGERY
Payer: MEDICARE

## 2021-04-30 DIAGNOSIS — Z48.89: Primary | ICD-10-CM

## 2021-04-30 DIAGNOSIS — S82.899D: ICD-10-CM

## 2021-05-05 NOTE — REP
Addended by: Cedric Valenzuela on: 5/5/2021 03:05 PM     Modules accepted: Orders INDICATION:

fall on ice



COMPARISON:

None.



TECHNIQUE:

AP, and lateral views of the left tibia/fibula.



FINDINGS:

There is a nondisplaced oblique fracture of the distal fibular shaft and posterior

malleolus of the tibia with soft tissue swelling.



IMPRESSION:

Fracture of the distal fibula and posterior tibial malleolus.





<Electronically signed by Maycol Lenz > 03/01/21 [id number]

## 2021-05-27 ENCOUNTER — HOSPITAL ENCOUNTER (OUTPATIENT)
Dept: HOSPITAL 53 - M SOG | Age: 71
End: 2021-05-27
Attending: ORTHOPAEDIC SURGERY
Payer: MEDICARE

## 2021-05-27 DIAGNOSIS — Z47.89: Primary | ICD-10-CM

## 2021-05-27 NOTE — REP
INDICATION:

F/U FX.



COMPARISON:

None.



TECHNIQUE:

AP, lateral, bilateral oblique views of the left ankle



FINDINGS:

Stable fixation for medial and lateral malleolar fractures again noted.  Continued

overlying soft tissue swelling again appreciated.  No obvious new acute pathology.



IMPRESSION:

Relatively stable examination.





<Electronically signed by Maycol Lenz > 05/27/21 [id number]

## 2021-05-28 ENCOUNTER — HOSPITAL ENCOUNTER (OUTPATIENT)
Dept: HOSPITAL 53 - M PT | Age: 71
LOS: 3 days | End: 2021-05-31
Attending: ORTHOPAEDIC SURGERY
Payer: MEDICARE

## 2021-05-28 DIAGNOSIS — Z48.89: Primary | ICD-10-CM

## 2021-06-24 ENCOUNTER — HOSPITAL ENCOUNTER (OUTPATIENT)
Dept: HOSPITAL 53 - M PT | Age: 71
LOS: 6 days | End: 2021-06-30
Attending: ORTHOPAEDIC SURGERY
Payer: MEDICARE

## 2021-06-24 DIAGNOSIS — Z48.89: Primary | ICD-10-CM

## 2021-06-24 DIAGNOSIS — S82.892D: ICD-10-CM
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
## 2021-07-01 ENCOUNTER — HOSPITAL ENCOUNTER (OUTPATIENT)
Dept: HOSPITAL 53 - M WHC | Age: 71
End: 2021-07-01
Attending: INTERNAL MEDICINE
Payer: MEDICARE

## 2021-07-01 DIAGNOSIS — M85.89: Primary | ICD-10-CM

## 2021-07-01 NOTE — DEXAMM
INDICATION:

DISORDER OF BONE/M85.80.



COMPARISON:

Most recent comparison densitometry study is from September 24, 2013.  The most remote

is June 21, 2004.



TECHNIQUE:

Bone density was measured using dual-energy x-ray absorptionmetry (DEXA).



FINDINGS:

AP SPINE L1-L4

BMD 1.262 g/cm2

Young Adult T-Score 0.8

Age Matched Z-Score 2.5.



LT FEMUR, TOTAL

BMD 0.926 g/cm2

Young Adult T-Score -0.6

Age Matched Z-Score 0.9.



LT NECK

BMD 0.863 g/cm2

Young Adult T-Score -1.3

Age Matched Z-Score 0.5.



RT FEMUR, TOTAL

BMD 0.935 g/cm2

Young Adult T-Score -0.6

Age Matched Z-Score 1.0.



RT NECK

BMD 0.843 g/cm2

Young Adult T-Score -1.4

Age Matched Z-Score 0.4.



IMPRESSION:

There is normal bone density of the spine.



There is low bone density of the left hip.





There is low bone density of the right hip.



The density of the spine has increased 20.4% since the initial exam on June 24, 2004.





The density of the spine increased 5.3% since most recent exam on September 24, 2013.





The density of the left hip has decreased 8.1% since initial exam on June 21, 2004.





The density of the left hip has decreased 6.5% since most recent exam on May 14, 2018.





The density of the right hip has decreased 9.2% since the initial exam on June 21, 2004.





The density of the right hip has decreased 3.2% since the most recent exam on May 14,

2018.



FOLLOW-UP:

Recommendation for the next bone density exam: 2 years.





<Electronically signed by Terrence Do > 07/01/21 [id number]

## 2021-07-08 ENCOUNTER — HOSPITAL ENCOUNTER (OUTPATIENT)
Dept: HOSPITAL 53 - M PT | Age: 71
LOS: 23 days | End: 2021-07-31
Attending: ORTHOPAEDIC SURGERY
Payer: MEDICARE

## 2021-07-08 DIAGNOSIS — Z48.89: Primary | ICD-10-CM

## 2021-08-04 ENCOUNTER — HOSPITAL ENCOUNTER (OUTPATIENT)
Dept: HOSPITAL 53 - M LAB REF | Age: 71
End: 2021-08-04
Attending: INTERNAL MEDICINE
Payer: MEDICARE

## 2021-08-04 DIAGNOSIS — R20.2: ICD-10-CM

## 2021-08-04 DIAGNOSIS — R53.83: Primary | ICD-10-CM

## 2021-08-10 ENCOUNTER — HOSPITAL ENCOUNTER (OUTPATIENT)
Dept: HOSPITAL 53 - M LAB | Age: 71
End: 2021-08-10
Attending: ORTHOPAEDIC SURGERY
Payer: MEDICARE
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
## 2021-08-10 DIAGNOSIS — S82.842D: Primary | ICD-10-CM
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
## 2021-08-10 DIAGNOSIS — T81.41XD: ICD-10-CM

## 2021-08-10 LAB
BASOPHILS # BLD AUTO: 0 10^3/UL (ref 0–0.2)
BASOPHILS NFR BLD AUTO: 0.3 % (ref 0–1)
EOSINOPHIL # BLD AUTO: 0.1 10^3/UL (ref 0–0.5)
EOSINOPHIL NFR BLD AUTO: 0.9 % (ref 0–3)
ERYTHROCYTE [SEDIMENTATION RATE] IN BLOOD BY WESTERGREN METHOD: 45 MM/HR (ref 0–30)
HCT VFR BLD AUTO: 33.5 % (ref 36–47)
HGB BLD-MCNC: 10.4 G/DL (ref 12–15.5)
LYMPHOCYTES # BLD AUTO: 2.3 10^3/UL (ref 1.5–5)
LYMPHOCYTES NFR BLD AUTO: 23 % (ref 24–44)
MCH RBC QN AUTO: 28 PG (ref 27–33)
MCHC RBC AUTO-ENTMCNC: 31 G/DL (ref 32–36.5)
MCV RBC AUTO: 90.3 FL (ref 80–96)
MONOCYTES # BLD AUTO: 0.7 10^3/UL (ref 0–0.8)
MONOCYTES NFR BLD AUTO: 7.4 % (ref 2–8)
NEUTROPHILS # BLD AUTO: 6.8 10^3/UL (ref 1.5–8.5)
NEUTROPHILS NFR BLD AUTO: 68.1 % (ref 36–66)
PLATELET # BLD AUTO: 397 10^3/UL (ref 150–450)
RBC # BLD AUTO: 3.71 10^6/UL (ref 4–5.4)
WBC # BLD AUTO: 9.9 10^3/UL (ref 4–10)

## 2021-08-23 ENCOUNTER — HOSPITAL ENCOUNTER (OUTPATIENT)
Dept: HOSPITAL 53 - M PLAIMG | Age: 71
End: 2021-08-23
Attending: INTERNAL MEDICINE
Payer: MEDICARE

## 2021-08-23 DIAGNOSIS — M25.472: ICD-10-CM

## 2021-08-23 DIAGNOSIS — A49.01: Primary | ICD-10-CM

## 2021-08-23 LAB
BASOPHILS # BLD AUTO: 0 10^3/UL (ref 0–0.2)
BASOPHILS NFR BLD AUTO: 0.6 % (ref 0–1)
EOSINOPHIL # BLD AUTO: 0.2 10^3/UL (ref 0–0.5)
EOSINOPHIL NFR BLD AUTO: 2.3 % (ref 0–3)
ERYTHROCYTE [SEDIMENTATION RATE] IN BLOOD BY WESTERGREN METHOD: 18 MM/HR (ref 0–30)
HCT VFR BLD AUTO: 35.8 % (ref 36–47)
HGB BLD-MCNC: 11.3 G/DL (ref 12–15.5)
LYMPHOCYTES # BLD AUTO: 3.1 10^3/UL (ref 1.5–5)
LYMPHOCYTES NFR BLD AUTO: 47.3 % (ref 24–44)
MCH RBC QN AUTO: 28.8 PG (ref 27–33)
MCHC RBC AUTO-ENTMCNC: 31.6 G/DL (ref 32–36.5)
MCV RBC AUTO: 91.3 FL (ref 80–96)
MONOCYTES # BLD AUTO: 0.5 10^3/UL (ref 0–0.8)
MONOCYTES NFR BLD AUTO: 8.3 % (ref 2–8)
NEUTROPHILS # BLD AUTO: 2.7 10^3/UL (ref 1.5–8.5)
NEUTROPHILS NFR BLD AUTO: 41.3 % (ref 36–66)
PLATELET # BLD AUTO: 451 10^3/UL (ref 150–450)
RBC # BLD AUTO: 3.92 10^6/UL (ref 4–5.4)
WBC # BLD AUTO: 6.5 10^3/UL (ref 4–10)

## 2021-08-23 PROCEDURE — 36415 COLL VENOUS BLD VENIPUNCTURE: CPT

## 2021-08-23 PROCEDURE — 86140 C-REACTIVE PROTEIN: CPT

## 2021-08-23 PROCEDURE — 73600 X-RAY EXAM OF ANKLE: CPT

## 2021-08-23 PROCEDURE — 85652 RBC SED RATE AUTOMATED: CPT

## 2021-08-23 PROCEDURE — 85025 COMPLETE CBC W/AUTO DIFF WBC: CPT

## 2021-08-23 NOTE — REP
INDICATION:

METHICILLIN SUSCEP STAPH INFECTION, UNSP SITE



COMPARISON:

05/27/2021



TECHNIQUE:

AP and lateral left ankle.



FINDINGS:

Status post satisfactory open reduction and fixation.  Osseous structures are

otherwise normal and without periosteal reaction or irregularity to suggest

osteomyelitis.  Overlying soft tissue swelling is nonspecific.



IMPRESSION:

Soft tissue swelling.





<Electronically signed by Maycol Lenz > 08/23/21 [id number]

## 2021-11-04 ENCOUNTER — HOSPITAL ENCOUNTER (OUTPATIENT)
Dept: HOSPITAL 53 - M WHC | Age: 71
End: 2021-11-04
Attending: INTERNAL MEDICINE
Payer: MEDICARE

## 2021-11-04 DIAGNOSIS — R92.2: Primary | ICD-10-CM

## 2021-11-04 NOTE — REP
INDICATION:

ENCNTR SCREEN MAMMO FOR MALIG NEOPLASM OF BREAS.



COMPARISON:

Multiple the latest 09/24/2013.  There are no prior DBT images to review



TECHNIQUE:

Digital screening mammography was carried out bilaterally in the CC and MLO

projections using both 2D and 3D modalities and compared to the prior exams.  By

history, the patient has no complaints of a palpable breast abnormality or other

significant breast complaints.



FINDINGS:

The breasts are unchanged in size and shape.  Dense heterogenous nodular

fibroglandular elements are again seen bilaterally.  Scattered stable benign appearing

calcifications are again seen bilaterally.  Some of these are in groups but no one

group appears more suspicious than any other.



In the right breast near 12 o'clock there is a potential nayeli asymmetric density.



In the left breast central retroareolar region there is a potential nayeli density.



No other suspicious features are seen in either breast.  There is no skin thickening

or nipple retraction.



The Volpara volumetric breast density pattern is b.



IMPRESSION:

BIRADS/ACR category 0 mammogram.  Potential finding seen in each breast as described

above.  Diagnostic digital DBT spot compression views are recommended bilaterally.

Near the 12 o'clock position right breast in central retroareolar region left breast.

In addition, bilateral diagnostic ultrasonography may be indicated.



This patient's Tyrer-Cuzick lifetime breast cancer risk assessment score is 3.7%.



This mammogram was interpreted with the aid of an FDA-approved computer-aided

detection system.



The patient states she had a clinical breast exam in August 2021.



The patient letter being requested is M0.



RECOMMENDATION:

As above





<Electronically signed by Abdirizak Greer > 11/04/21 [id number]

## 2021-12-01 ENCOUNTER — HOSPITAL ENCOUNTER (OUTPATIENT)
Dept: HOSPITAL 53 - M WHC | Age: 71
End: 2021-12-01
Attending: INTERNAL MEDICINE
Payer: MEDICARE

## 2021-12-01 DIAGNOSIS — R92.2: Primary | ICD-10-CM

## 2021-12-01 PROCEDURE — 76642 ULTRASOUND BREAST LIMITED: CPT

## 2021-12-01 PROCEDURE — 77066 DX MAMMO INCL CAD BI: CPT

## 2021-12-28 ENCOUNTER — HOSPITAL ENCOUNTER (OUTPATIENT)
Dept: HOSPITAL 53 - M PLAIMG | Age: 71
End: 2021-12-28
Attending: INTERNAL MEDICINE
Payer: MEDICARE

## 2021-12-28 DIAGNOSIS — R05.3: Primary | ICD-10-CM

## 2022-01-19 ENCOUNTER — HOSPITAL ENCOUNTER (OUTPATIENT)
Dept: HOSPITAL 53 - M LABSMTC | Age: 72
End: 2022-01-19
Attending: PEDIATRICS

## 2022-01-19 DIAGNOSIS — Z20.822: Primary | ICD-10-CM

## 2022-03-08 ENCOUNTER — HOSPITAL ENCOUNTER (OUTPATIENT)
Dept: HOSPITAL 53 - M PLAIMG | Age: 72
End: 2022-03-08
Attending: INTERNAL MEDICINE
Payer: MEDICARE

## 2022-03-08 DIAGNOSIS — R05.9: Primary | ICD-10-CM

## 2022-04-14 ENCOUNTER — HOSPITAL ENCOUNTER (OUTPATIENT)
Dept: HOSPITAL 53 - M LAB REF | Age: 72
End: 2022-04-14
Attending: INTERNAL MEDICINE
Payer: MEDICARE

## 2022-04-14 DIAGNOSIS — D50.9: ICD-10-CM

## 2022-04-14 DIAGNOSIS — R53.83: Primary | ICD-10-CM

## 2022-06-09 ENCOUNTER — HOSPITAL ENCOUNTER (OUTPATIENT)
Dept: HOSPITAL 53 - M CARPUL | Age: 72
End: 2022-06-09
Attending: NURSE PRACTITIONER
Payer: MEDICARE

## 2022-06-09 DIAGNOSIS — R05.9: Primary | ICD-10-CM

## 2022-06-09 PROCEDURE — 94070 EVALUATION OF WHEEZING: CPT

## 2022-06-09 PROCEDURE — 95070 INHLJ BRNCL CHALLENGE TSTG: CPT

## 2022-06-22 ENCOUNTER — HOSPITAL ENCOUNTER (OUTPATIENT)
Dept: HOSPITAL 53 - M LAB REF | Age: 72
End: 2022-06-22
Attending: INTERNAL MEDICINE
Payer: MEDICARE

## 2022-06-22 DIAGNOSIS — R53.83: Primary | ICD-10-CM

## 2022-07-14 ENCOUNTER — HOSPITAL ENCOUNTER (OUTPATIENT)
Dept: HOSPITAL 53 - M CARPUL | Age: 72
End: 2022-07-14
Attending: NURSE PRACTITIONER
Payer: MEDICARE

## 2022-07-14 DIAGNOSIS — R05.9: Primary | ICD-10-CM

## 2022-10-07 ENCOUNTER — HOSPITAL ENCOUNTER (OUTPATIENT)
Dept: HOSPITAL 53 - M LAB REF | Age: 72
End: 2022-10-07
Attending: INTERNAL MEDICINE
Payer: MEDICARE

## 2022-10-07 DIAGNOSIS — D50.9: Primary | ICD-10-CM

## 2022-10-07 DIAGNOSIS — R53.83: ICD-10-CM

## 2022-12-29 ENCOUNTER — HOSPITAL ENCOUNTER (OUTPATIENT)
Dept: HOSPITAL 53 - M WHC | Age: 72
End: 2022-12-29
Attending: INTERNAL MEDICINE
Payer: MEDICARE

## 2022-12-29 DIAGNOSIS — Z12.31: Primary | ICD-10-CM

## 2023-01-10 ENCOUNTER — HOSPITAL ENCOUNTER (OUTPATIENT)
Dept: HOSPITAL 53 - M LAB REF | Age: 73
End: 2023-01-10
Attending: INTERNAL MEDICINE
Payer: MEDICARE

## 2023-01-10 DIAGNOSIS — D50.9: Primary | ICD-10-CM

## 2023-04-20 ENCOUNTER — HOSPITAL ENCOUNTER (OUTPATIENT)
Dept: HOSPITAL 53 - M LAB REF | Age: 73
End: 2023-04-20
Attending: INTERNAL MEDICINE
Payer: MEDICARE

## 2023-04-20 DIAGNOSIS — D50.9: Primary | ICD-10-CM

## 2023-04-20 LAB
FERRITIN SERPL-MCNC: 13 NG/ML (ref 7.3–270.7)
IRON SATN MFR SERPL: 20.2 % (ref 13.2–45)
IRON SERPL-MCNC: 62 UG/DL (ref 50–170)
TIBC SERPL-MCNC: 307 UG/DL (ref 250–425)
VIT B12 SERPL-MCNC: 700 PG/ML (ref 211–911)

## 2023-10-12 ENCOUNTER — HOSPITAL ENCOUNTER (OUTPATIENT)
Dept: HOSPITAL 53 - M LAB REF | Age: 73
End: 2023-10-12
Attending: INTERNAL MEDICINE
Payer: MEDICARE

## 2023-10-12 DIAGNOSIS — D50.9: Primary | ICD-10-CM

## 2024-01-05 ENCOUNTER — HOSPITAL ENCOUNTER (OUTPATIENT)
Dept: HOSPITAL 53 - M PLAIMG | Age: 74
End: 2024-01-05
Attending: INTERNAL MEDICINE
Payer: MEDICARE

## 2024-01-05 DIAGNOSIS — R05.9: Primary | ICD-10-CM

## 2024-02-01 ENCOUNTER — HOSPITAL ENCOUNTER (OUTPATIENT)
Dept: HOSPITAL 53 - M WHC | Age: 74
End: 2024-02-01
Attending: INTERNAL MEDICINE
Payer: MEDICARE

## 2024-02-01 DIAGNOSIS — M85.89: ICD-10-CM

## 2024-02-01 DIAGNOSIS — Z12.31: Primary | ICD-10-CM

## 2024-04-23 ENCOUNTER — HOSPITAL ENCOUNTER (OUTPATIENT)
Dept: HOSPITAL 53 - M LAB REF | Age: 74
End: 2024-04-23
Attending: INTERNAL MEDICINE
Payer: MEDICARE

## 2024-04-23 DIAGNOSIS — D50.9: Primary | ICD-10-CM

## 2024-05-31 ENCOUNTER — HOSPITAL ENCOUNTER (OUTPATIENT)
Dept: HOSPITAL 53 - M WUC | Age: 74
End: 2024-05-31
Payer: MEDICARE

## 2024-05-31 DIAGNOSIS — R05.9: Primary | ICD-10-CM

## 2024-05-31 DIAGNOSIS — R06.02: ICD-10-CM

## 2025-01-09 ENCOUNTER — HOSPITAL ENCOUNTER (OUTPATIENT)
Dept: HOSPITAL 53 - M LAB REF | Age: 75
End: 2025-01-09
Attending: INTERNAL MEDICINE
Payer: MEDICARE

## 2025-01-09 DIAGNOSIS — M06.4: Primary | ICD-10-CM

## 2025-01-09 DIAGNOSIS — D50.9: ICD-10-CM

## 2025-01-09 LAB
CRP SERPL-MCNC: 2.73 MG/DL (ref ?–1)
FERRITIN SERPL-MCNC: 77.3 NG/ML (ref 7.3–270.7)

## 2025-01-14 LAB
ANA SER QL IF: POSITIVE
ANA TITR SER IF: (no result) TITER
ANA TITR SER IF: (no result) TITER
B BURGDOR IGG+IGM SER QL IA: <= 0.9 INDEX (ref ?–0.9)

## 2025-03-26 ENCOUNTER — HOSPITAL ENCOUNTER (OUTPATIENT)
Dept: HOSPITAL 53 - M WHC | Age: 75
End: 2025-03-26
Attending: INTERNAL MEDICINE
Payer: MEDICARE

## 2025-03-26 DIAGNOSIS — R92.323: ICD-10-CM

## 2025-03-26 DIAGNOSIS — Z12.31: Primary | ICD-10-CM

## 2025-04-23 ENCOUNTER — HOSPITAL ENCOUNTER (OUTPATIENT)
Dept: HOSPITAL 53 - M LAB REF | Age: 75
End: 2025-04-23
Attending: INTERNAL MEDICINE
Payer: MEDICARE

## 2025-04-23 DIAGNOSIS — M06.4: Primary | ICD-10-CM

## 2025-07-08 ENCOUNTER — HOSPITAL ENCOUNTER (OUTPATIENT)
Dept: HOSPITAL 53 - M SFHCRHEU | Age: 75
End: 2025-07-08
Attending: INTERNAL MEDICINE
Payer: MEDICARE

## 2025-07-08 ENCOUNTER — HOSPITAL ENCOUNTER (OUTPATIENT)
Dept: HOSPITAL 53 - M PLAIMG | Age: 75
End: 2025-07-08
Attending: INTERNAL MEDICINE
Payer: MEDICARE

## 2025-07-08 DIAGNOSIS — M06.4: ICD-10-CM

## 2025-07-08 DIAGNOSIS — M19.042: ICD-10-CM

## 2025-07-08 DIAGNOSIS — R76.8: Primary | ICD-10-CM

## 2025-07-08 DIAGNOSIS — Z11.59: ICD-10-CM

## 2025-07-08 DIAGNOSIS — M18.0: ICD-10-CM

## 2025-07-08 DIAGNOSIS — M25.772: ICD-10-CM

## 2025-07-08 DIAGNOSIS — M25.771: ICD-10-CM

## 2025-07-08 DIAGNOSIS — M19.041: Primary | ICD-10-CM

## 2025-07-08 DIAGNOSIS — M77.32: ICD-10-CM

## 2025-07-08 DIAGNOSIS — R53.83: ICD-10-CM

## 2025-07-08 DIAGNOSIS — Z87.81: ICD-10-CM

## 2025-07-08 LAB
25(OH)D3 SERPL-MCNC: 99.2 NG/ML (ref 20–100)
ALBUMIN SERPL BCG-MCNC: 4.2 G/DL (ref 3.2–5.2)
ALP SERPL-CCNC: 152 U/L (ref 35–104)
ALT SERPL W P-5'-P-CCNC: 21 U/L (ref 7–40)
AMORPH SED URNS QL MICRO: (no result)
AMORPH URATE CRY URNS QL MICRO: (no result)
APPEARANCE UR: CLEAR
AST SERPL-CCNC: 29 U/L (ref ?–34)
BACTERIA UR QL AUTO: NEGATIVE
BASOPHILS # BLD AUTO: 0 10^3/UL (ref 0–0.2)
BASOPHILS NFR BLD AUTO: 0.4 % (ref 0–1)
BILIRUB CONJ SERPL-MCNC: 0.1 MG/DL (ref ?–0.4)
BILIRUB SERPL-MCNC: 0.5 MG/DL (ref 0.3–1.2)
BILIRUB UR QL STRIP.AUTO: NEGATIVE
BUN SERPL-MCNC: 13 MG/DL (ref 9–23)
C4 SERPL-MCNC: 42.1 MG/DL (ref 12–36)
CALCIUM SERPL-MCNC: 9.9 MG/DL (ref 8.3–10.6)
CAOX CRY URNS QL MICRO: (no result)
CHLORIDE SERPL-SCNC: 106 MMOL/L (ref 98–107)
CO2 SERPL-SCNC: 27 MMOL/L (ref 20–31)
COLOR UR AUTO: (no result)
CREAT SERPL-MCNC: 0.75 MG/DL (ref 0.55–1.3)
CREAT UR-MCNC: 26.5 MG/DL
CRP SERPL-MCNC: < 0.5 MG/DL (ref ?–1)
EOSINOPHIL # BLD AUTO: 0.1 10^3/UL (ref 0–0.5)
EOSINOPHIL NFR BLD AUTO: 1.3 % (ref 0–3)
ERYTHROCYTE [SEDIMENTATION RATE] IN BLOOD BY WESTERGREN METHOD: 16 MM/HR (ref 0–30)
GFR SERPL CREATININE-BSD FRML MDRD: 83 ML/MIN/{1.73_M2} (ref 39–?)
GLUCOSE SERPL-MCNC: 104 MG/DL (ref 74–106)
GLUCOSE UR QL STRIP.AUTO: NEGATIVE MG/DL
GRAN CASTS URNS QL MICRO: (no result) /LPF
HBV SURFACE AB SER QL: NEGATIVE
HBV SURFACE AG SER-ACNC: NEGATIVE [IU]/L
HCT VFR BLD AUTO: 45.1 % (ref 36–47)
HCV AB SER QL: 0.08 INDEX (ref ?–0.8)
HGB BLD-MCNC: 15 G/DL (ref 12–15.5)
HGB UR QL STRIP.AUTO: NEGATIVE
KETONES UR QL STRIP.AUTO: NEGATIVE MG/DL
LEUKOCYTE ESTERASE UR QL STRIP.AUTO: NEGATIVE
LYMPHOCYTES # BLD AUTO: 2.8 10^3/UL (ref 1.5–5)
LYMPHOCYTES NFR BLD AUTO: 35.3 % (ref 24–44)
MCH RBC QN AUTO: 31.4 PG (ref 27–33)
MCHC RBC AUTO-ENTMCNC: 33.3 G/DL (ref 32–36.5)
MCV RBC AUTO: 94.5 FL (ref 80–96)
MONOCYTES # BLD AUTO: 0.5 10^3/UL (ref 0–0.8)
MONOCYTES NFR BLD AUTO: 5.8 % (ref 2–8)
MUCOUS THREADS URNS QL MICRO: (no result)
NEUTROPHILS # BLD AUTO: 4.5 10^3/UL (ref 1.5–8.5)
NEUTROPHILS NFR BLD AUTO: 56.9 % (ref 36–66)
NITRITE UR QL STRIP.AUTO: NEGATIVE
PH UR STRIP.AUTO: 6 UNITS (ref 5–9)
PLATELET # BLD AUTO: 354 10^3/UL (ref 150–450)
POTASSIUM SERPL-SCNC: 4.1 MMOL/L (ref 3.5–5.1)
PROT SERPL-MCNC: 7.3 G/DL (ref 5.7–8.2)
PROT UR QL STRIP.AUTO: NEGATIVE MG/DL
PROT UR-MCNC: 6.4 MG/DL (ref 0–14)
RBC # BLD AUTO: 4.77 10^6/UL (ref 4–5.4)
RBC # UR AUTO: 0 /HPF (ref 0–3)
RENAL EPI CELLS #/AREA URNS HPF: (no result) /HPF
SODIUM SERPL-SCNC: 143 MMOL/L (ref 136–145)
SP GR UR STRIP.AUTO: 1 (ref 1–1.03)
SQUAMOUS #/AREA URNS AUTO: 0 /HPF (ref 0–6)
TRANS CELLS #/AREA URNS HPF: (no result) /HPF
TRI-PHOS CRY URNS QL MICRO: (no result)
UROBILINOGEN UR QL STRIP.AUTO: 0.2 MG/DL (ref 0–2)
WAXY CASTS #/AREA UR COMP ASSIST: (no result) /LPF
WBC # BLD AUTO: 7.8 10^3/UL (ref 4–10)
WBC #/AREA URNS AUTO: (no result) /HPF (ref ?–1)
WBC #/AREA URNS AUTO: 0 /HPF (ref 0–3)
YEAST UR QL AUTO: (no result)

## 2025-07-10 LAB
GAMMA INTERFERON BACKGROUND BLD IA-ACNC: 0.02 IU/ML
HBV CORE AB SERPL QL IA: (no result)
M TB IFN-G BLD-IMP: NEGATIVE
M TB IFN-G CD4+ BCKGRND COR BLD-ACNC: 0 IU/ML
M TB IFN-G CD4+CD8+ BCKGRND COR BLD-ACNC: 0 IU/ML
MITOGEN IGNF BCKGRD COR BLD-ACNC: 8.97 IU/ML

## 2025-07-13 LAB — CH50 SERPL-ACNC: > 60 U/ML (ref 31–60)
